# Patient Record
Sex: MALE | Race: WHITE | HISPANIC OR LATINO | Employment: FULL TIME | URBAN - METROPOLITAN AREA
[De-identification: names, ages, dates, MRNs, and addresses within clinical notes are randomized per-mention and may not be internally consistent; named-entity substitution may affect disease eponyms.]

---

## 2022-02-03 ENCOUNTER — HOSPITAL ENCOUNTER (EMERGENCY)
Facility: MEDICAL CENTER | Age: 32
End: 2022-02-03
Attending: EMERGENCY MEDICINE
Payer: COMMERCIAL

## 2022-02-03 VITALS
RESPIRATION RATE: 16 BRPM | HEART RATE: 76 BPM | SYSTOLIC BLOOD PRESSURE: 129 MMHG | HEIGHT: 65 IN | DIASTOLIC BLOOD PRESSURE: 77 MMHG | WEIGHT: 162.7 LBS | TEMPERATURE: 98.5 F | BODY MASS INDEX: 27.11 KG/M2 | OXYGEN SATURATION: 97 %

## 2022-02-03 DIAGNOSIS — H93.11 RIGHT-SIDED TINNITUS: ICD-10-CM

## 2022-02-03 PROCEDURE — 99282 EMERGENCY DEPT VISIT SF MDM: CPT

## 2022-02-03 PROCEDURE — 69209 REMOVE IMPACTED EAR WAX UNI: CPT

## 2022-02-03 PROCEDURE — 700102 HCHG RX REV CODE 250 W/ 637 OVERRIDE(OP): Performed by: EMERGENCY MEDICINE

## 2022-02-03 PROCEDURE — A9270 NON-COVERED ITEM OR SERVICE: HCPCS | Performed by: EMERGENCY MEDICINE

## 2022-02-03 RX ORDER — ACETAMINOPHEN 500 MG
1000 TABLET ORAL ONCE
Status: COMPLETED | OUTPATIENT
Start: 2022-02-03 | End: 2022-02-03

## 2022-02-03 RX ADMIN — IBUPROFEN 800 MG: 600 TABLET ORAL at 23:29

## 2022-02-03 RX ADMIN — ACETAMINOPHEN 1000 MG: 500 TABLET, FILM COATED ORAL at 23:29

## 2022-02-04 NOTE — DISCHARGE INSTRUCTIONS
Please call the ENT office to make an appointment regarding your ringing in the right ear.  Please follow-up with outpatient PCP for further evaluation.  Come back if your symptoms worsen.  They keep coming today.

## 2022-02-04 NOTE — ED TRIAGE NOTES
"Chief Complaint   Patient presents with   • Headache     \"beeping and buzzing sound in my head and a headache for days\". \"Its hard toconcentrate with it\".    • Lightheadedness     for the last 4 days. \"began when the sound started\".      /84   Pulse 82   Temp 37 °C (98.6 °F) (Temporal)   Resp 16   Ht 1.651 m (5' 5\")   Wt 73.8 kg (162 lb 11.2 oz)   SpO2 98%   BMI 27.07 kg/m²     Pt ambulates to triage with steady gait with a complaint of a R sided headache with \"a beeping and buzzing\" in his R ear for the last 4 days. States he has been feeling lightheaded as well. Denies weakness or slurred speech but has had a hard time concentrating with the buzzing. Denies any known medical hx.   "

## 2022-02-04 NOTE — ED PROVIDER NOTES
"ED Provider Note  ED Provider Note    Scribed for Akbar Lopez by Akbar Lopez. 2/3/2022  11:15 PM    Primary care provider: No primary care provider on file.  Means of arrival: Private vehicle  History obtained from: Patient  History limited by: None    CHIEF COMPLAINT  Chief Complaint   Patient presents with   • Headache     \"beeping and buzzing sound in my head and a headache for days\". \"Its hard toconcentrate with it\".    • Lightheadedness     for the last 4 days. \"began when the sound started\".        HPI  Elder Asencio is a 32 y.o. male who presents to the Emergency Department with no past medical history.   was used for all interactions as the patient is primarily Pashto-speaking.  He states that he has been having a buzzing in his right ear for about 15 days.  Also had a mild right-sided headache has been intermittent.  He denies any neck pain.  He is not actually against Covid.  He denies any Covid symptoms however of cough, shortness of breath, abdominal pain, nausea, vomiting or diarrhea.  His review of systems is otherwise negative and he denies having a fever.  He denies any discharge from the right ear.  Denies any pain over the right ear.  Denies alcohol, drug or tobacco abuse.  Quality: Mild aching right-sided headache  Duration: 15 days  Severity: Mild  Associated sx: Right-sided tinnitus    REVIEW OF SYSTEMS  As above, all other systems reviewed and are negative.   See HPI for further details.     PAST MEDICAL HISTORY     SURGICAL HISTORY  patient denies any surgical history  SOCIAL HISTORY  Social History     Tobacco Use   • Smoking status: Not on file   • Smokeless tobacco: Not on file   Substance Use Topics   • Alcohol use: Not on file   • Drug use: Not on file      Social History     Substance and Sexual Activity   Drug Use Not on file     FAMILY HISTORY  No family history on file.  CURRENT MEDICATIONS  Home Medications     Reviewed by Lev Jean Baptiste, " "RADHA (Registered Nurse) on 02/03/22 at 2058  Med List Status: Not Addressed   Medication Last Dose Status        Patient Celso Taking any Medications                     ALLERGIES  Not on File  PHYSICAL EXAM  VITAL SIGNS:   Vitals:    02/03/22 2040 02/03/22 2046   BP: 139/84    Pulse: 82    Resp: 16    Temp: 37 °C (98.6 °F)    TempSrc: Temporal    SpO2: 98%    Weight:  73.8 kg (162 lb 11.2 oz)   Height:  1.651 m (5' 5\")       Vitals: My interpretation: normotensive, not tachycardic, afebrile, not hypoxic    Reinterpretation of vitals: Unchanged    PE:   Gen: sitting comfortably, speaking clearly, appears in no acute distress   ENT: Mucous membranes moist, posterior pharynx clear, uvula midline, nares patent bilaterally, there is no abnormality of the outer ear, external auditory meatus, no mastoid tenderness tenderness, ear canals obstructed by cerumen.  No tenderness over the temporal region  Neck: Supple, FROM  Pulmonary: Lungs are clear to auscultation bilaterally. No tachypnea  CV:  RRR, no murmur appreciated, pulses 2+ in both upper and lower extremities  Abdomen: soft, NT/ND; no rebound/guarding  : no CVA or suprapubic tenderness   Neuro: A&Ox4 (person, place, time, situation), speech fluent, gait steady, no focal deficits appreciated  Skin: No rash or lesions.  No pallor or jaundice.  No cyanosis.  Warm and dry.     DIAGNOSTIC STUDIES / PROCEDURES    LABS  No results found for this or any previous visit.   All labs reviewed by me. Significant for not applicable    RADIOLOGY  No orders to display     The radiologist's interpretation of all radiological studies have been reviewed by me.    COURSE & MEDICAL DECISION MAKING  Nursing notes, VS, PMSFHx, labs, imaging, EKG reviewed in chart.    Heart Score: Not applicable    MDM: 11:15 PM Elder Asencio is a 32 y.o. male who presented with some mild right-sided tinnitus for over 2 weeks.  He also describes occasional right-sided headaches acute on chronic.  " He has a benign and nonfocal neurological exam.  Physical exam shows that his ear canals are occluded by cerumen and they were irrigated.  After irrigation, visual inspection demonstrated no abnormalities.  He has no signs of mastoiditis, no signs of carotid dissection, no bruits.  He does describe some mild headaches but this seems unrelated.  The rest of his ENT and physical exam are unremarkable.  I gave him ENT follow-up, recommended conservative Tylenol and Motrin for symptomatic relief and outpatient follow-up as needed.  Possible viral etiology of tinnitus.  Patient well-appearing, discharge, ambulatory, no acute distress verbalized understanding strict return precautions outpatient ENT follow-up.    FINAL IMPRESSION  1. Right-sided tinnitus Acute        The note accurately reflects work and decisions made by me.  Akbar Lopez  2/3/2022  11:16 PM

## 2023-04-03 ENCOUNTER — HOSPITAL ENCOUNTER (OUTPATIENT)
Dept: LAB | Facility: MEDICAL CENTER | Age: 33
End: 2023-04-03
Payer: COMMERCIAL

## 2023-04-03 LAB
ALBUMIN SERPL BCP-MCNC: 4.3 G/DL (ref 3.2–4.9)
ALBUMIN/GLOB SERPL: 1.4 G/DL
ALP SERPL-CCNC: 79 U/L (ref 30–99)
ALT SERPL-CCNC: 22 U/L (ref 2–50)
ANION GAP SERPL CALC-SCNC: 9 MMOL/L (ref 7–16)
AST SERPL-CCNC: 15 U/L (ref 12–45)
BASOPHILS # BLD AUTO: 0.8 % (ref 0–1.8)
BASOPHILS # BLD: 0.04 K/UL (ref 0–0.12)
BILIRUB SERPL-MCNC: 0.6 MG/DL (ref 0.1–1.5)
BUN SERPL-MCNC: 19 MG/DL (ref 8–22)
CALCIUM ALBUM COR SERPL-MCNC: 9.1 MG/DL (ref 8.5–10.5)
CALCIUM SERPL-MCNC: 9.3 MG/DL (ref 8.5–10.5)
CHLORIDE SERPL-SCNC: 104 MMOL/L (ref 96–112)
CHOLEST SERPL-MCNC: 172 MG/DL (ref 100–199)
CO2 SERPL-SCNC: 26 MMOL/L (ref 20–33)
CREAT SERPL-MCNC: 0.79 MG/DL (ref 0.5–1.4)
EOSINOPHIL # BLD AUTO: 0.09 K/UL (ref 0–0.51)
EOSINOPHIL NFR BLD: 1.9 % (ref 0–6.9)
ERYTHROCYTE [DISTWIDTH] IN BLOOD BY AUTOMATED COUNT: 46.3 FL (ref 35.9–50)
EST. AVERAGE GLUCOSE BLD GHB EST-MCNC: 123 MG/DL
FASTING STATUS PATIENT QL REPORTED: NORMAL
GFR SERPLBLD CREATININE-BSD FMLA CKD-EPI: 122 ML/MIN/1.73 M 2
GLOBULIN SER CALC-MCNC: 3 G/DL (ref 1.9–3.5)
GLUCOSE SERPL-MCNC: 113 MG/DL (ref 65–99)
HBA1C MFR BLD: 5.9 % (ref 4–5.6)
HCT VFR BLD AUTO: 49 % (ref 42–52)
HCV AB SER QL: NORMAL
HDLC SERPL-MCNC: 59 MG/DL
HGB BLD-MCNC: 17 G/DL (ref 14–18)
HIV 1+2 AB+HIV1 P24 AG SERPL QL IA: NORMAL
IMM GRANULOCYTES # BLD AUTO: 0.01 K/UL (ref 0–0.11)
IMM GRANULOCYTES NFR BLD AUTO: 0.2 % (ref 0–0.9)
LDLC SERPL CALC-MCNC: 98 MG/DL
LYMPHOCYTES # BLD AUTO: 1.79 K/UL (ref 1–4.8)
LYMPHOCYTES NFR BLD: 37.2 % (ref 22–41)
MCH RBC QN AUTO: 31.1 PG (ref 27–33)
MCHC RBC AUTO-ENTMCNC: 34.7 G/DL (ref 33.7–35.3)
MCV RBC AUTO: 89.6 FL (ref 81.4–97.8)
MONOCYTES # BLD AUTO: 0.41 K/UL (ref 0–0.85)
MONOCYTES NFR BLD AUTO: 8.5 % (ref 0–13.4)
NEUTROPHILS # BLD AUTO: 2.47 K/UL (ref 1.82–7.42)
NEUTROPHILS NFR BLD: 51.4 % (ref 44–72)
NRBC # BLD AUTO: 0 K/UL
NRBC BLD-RTO: 0 /100 WBC
PLATELET # BLD AUTO: 185 K/UL (ref 164–446)
PMV BLD AUTO: 12.3 FL (ref 9–12.9)
POTASSIUM SERPL-SCNC: 4.4 MMOL/L (ref 3.6–5.5)
PROT SERPL-MCNC: 7.3 G/DL (ref 6–8.2)
RBC # BLD AUTO: 5.47 M/UL (ref 4.7–6.1)
SODIUM SERPL-SCNC: 139 MMOL/L (ref 135–145)
T PALLIDUM AB SER QL IA: NORMAL
TRIGL SERPL-MCNC: 74 MG/DL (ref 0–149)
TSH SERPL DL<=0.005 MIU/L-ACNC: 1.97 UIU/ML (ref 0.38–5.33)
WBC # BLD AUTO: 4.8 K/UL (ref 4.8–10.8)

## 2023-04-03 PROCEDURE — 80061 LIPID PANEL: CPT

## 2023-04-03 PROCEDURE — 87389 HIV-1 AG W/HIV-1&-2 AB AG IA: CPT

## 2023-04-03 PROCEDURE — 36415 COLL VENOUS BLD VENIPUNCTURE: CPT

## 2023-04-03 PROCEDURE — 84443 ASSAY THYROID STIM HORMONE: CPT

## 2023-04-03 PROCEDURE — 85025 COMPLETE CBC W/AUTO DIFF WBC: CPT

## 2023-04-03 PROCEDURE — 83036 HEMOGLOBIN GLYCOSYLATED A1C: CPT

## 2023-04-03 PROCEDURE — 86803 HEPATITIS C AB TEST: CPT

## 2023-04-03 PROCEDURE — 80053 COMPREHEN METABOLIC PANEL: CPT

## 2023-04-03 PROCEDURE — 86780 TREPONEMA PALLIDUM: CPT

## 2024-11-19 ENCOUNTER — APPOINTMENT (OUTPATIENT)
Dept: LAB | Facility: MEDICAL CENTER | Age: 34
End: 2024-11-19
Payer: COMMERCIAL